# Patient Record
Sex: FEMALE | Race: WHITE
[De-identification: names, ages, dates, MRNs, and addresses within clinical notes are randomized per-mention and may not be internally consistent; named-entity substitution may affect disease eponyms.]

---

## 2022-11-18 ENCOUNTER — HOSPITAL ENCOUNTER (OUTPATIENT)
Dept: HOSPITAL 95 - ORSCSDS | Age: 72
Discharge: HOME | End: 2022-11-18
Attending: ORTHOPAEDIC SURGERY
Payer: MEDICARE

## 2022-11-18 VITALS — WEIGHT: 126.77 LBS | HEIGHT: 61 IN | BODY MASS INDEX: 23.93 KG/M2

## 2022-11-18 DIAGNOSIS — S52.502A: Primary | ICD-10-CM

## 2022-11-18 DIAGNOSIS — Z87.891: ICD-10-CM

## 2022-11-18 DIAGNOSIS — Z79.899: ICD-10-CM

## 2022-11-18 DIAGNOSIS — E03.9: ICD-10-CM

## 2022-11-18 DIAGNOSIS — K21.9: ICD-10-CM

## 2022-11-18 DIAGNOSIS — Z79.82: ICD-10-CM

## 2022-11-18 PROCEDURE — 0PSJ04Z REPOSITION LEFT RADIUS WITH INTERNAL FIXATION DEVICE, OPEN APPROACH: ICD-10-PCS | Performed by: ORTHOPAEDIC SURGERY

## 2022-11-18 PROCEDURE — A9270 NON-COVERED ITEM OR SERVICE: HCPCS

## 2022-11-18 PROCEDURE — C1713 ANCHOR/SCREW BN/BN,TIS/BN: HCPCS

## 2022-11-18 NOTE — NUR
11/18/22 1220 Rajani Wing
9919 DR. GOINS NOTIFIED THAT LEFT WRIST DRESSING WITH FRESH BRIGHT
RED BLOOD THRU DRESSING. ORDERS WERE GIVEN TO REINFORCE DRESSING &
HOLD PRESSURE. PRESSURE HELD & SUPER FLUFFS X4 & ABD. APPLIED & NEW
ACE WRAP APPLIED. LEFT ARM UP ON PILLOW. APPROX 2IN BY 2IN OF BRIGHT
RED BLOOD ON DRESSING OBSERVED.